# Patient Record
Sex: MALE | Race: ASIAN | NOT HISPANIC OR LATINO | Employment: UNEMPLOYED | URBAN - METROPOLITAN AREA
[De-identification: names, ages, dates, MRNs, and addresses within clinical notes are randomized per-mention and may not be internally consistent; named-entity substitution may affect disease eponyms.]

---

## 2022-12-27 ENCOUNTER — APPOINTMENT (EMERGENCY)
Dept: CT IMAGING | Facility: HOSPITAL | Age: 82
End: 2022-12-27

## 2022-12-27 ENCOUNTER — HOSPITAL ENCOUNTER (EMERGENCY)
Facility: HOSPITAL | Age: 82
Discharge: HOME | End: 2022-12-27
Attending: EMERGENCY MEDICINE

## 2022-12-27 ENCOUNTER — APPOINTMENT (EMERGENCY)
Dept: RADIOLOGY | Facility: HOSPITAL | Age: 82
End: 2022-12-27

## 2022-12-27 VITALS
HEART RATE: 81 BPM | TEMPERATURE: 99.3 F | DIASTOLIC BLOOD PRESSURE: 60 MMHG | WEIGHT: 180 LBS | OXYGEN SATURATION: 95 % | SYSTOLIC BLOOD PRESSURE: 124 MMHG | RESPIRATION RATE: 22 BRPM

## 2022-12-27 DIAGNOSIS — J44.1 COPD EXACERBATION (HCC): Primary | ICD-10-CM

## 2022-12-27 LAB
2HR DELTA HS TROPONIN: 0 NG/L
ANION GAP SERPL CALCULATED.3IONS-SCNC: 8 MMOL/L (ref 4–13)
BASOPHILS # BLD MANUAL: 0 THOUSAND/UL (ref 0–0.1)
BASOPHILS NFR MAR MANUAL: 0 % (ref 0–1)
BUN SERPL-MCNC: 25 MG/DL (ref 5–25)
CALCIUM SERPL-MCNC: 9.3 MG/DL (ref 8.4–10.2)
CARDIAC TROPONIN I PNL SERPL HS: 7 NG/L
CARDIAC TROPONIN I PNL SERPL HS: 7 NG/L
CHLORIDE SERPL-SCNC: 106 MMOL/L (ref 96–108)
CO2 SERPL-SCNC: 22 MMOL/L (ref 21–32)
CREAT SERPL-MCNC: 1.03 MG/DL (ref 0.6–1.3)
D DIMER PPP FEU-MCNC: 1.59 UG/ML FEU
EOSINOPHIL # BLD MANUAL: 0 THOUSAND/UL (ref 0–0.4)
EOSINOPHIL NFR BLD MANUAL: 0 % (ref 0–6)
ERYTHROCYTE [DISTWIDTH] IN BLOOD BY AUTOMATED COUNT: 13.1 % (ref 11.6–15.1)
FLUAV RNA RESP QL NAA+PROBE: NEGATIVE
FLUBV RNA RESP QL NAA+PROBE: NEGATIVE
GFR SERPL CREATININE-BSD FRML MDRD: 67 ML/MIN/1.73SQ M
GLUCOSE SERPL-MCNC: 121 MG/DL (ref 65–140)
HCT VFR BLD AUTO: 46 % (ref 36.5–49.3)
HGB BLD-MCNC: 15 G/DL (ref 12–17)
LYMPHOCYTES # BLD AUTO: 0.26 THOUSAND/UL (ref 0.6–4.47)
LYMPHOCYTES # BLD AUTO: 6 % (ref 14–44)
MAGNESIUM SERPL-MCNC: 2.2 MG/DL (ref 1.9–2.7)
MCH RBC QN AUTO: 31.6 PG (ref 26.8–34.3)
MCHC RBC AUTO-ENTMCNC: 32.6 G/DL (ref 31.4–37.4)
MCV RBC AUTO: 97 FL (ref 82–98)
MONOCYTES # BLD AUTO: 0.09 THOUSAND/UL (ref 0–1.22)
MONOCYTES NFR BLD: 2 % (ref 4–12)
NEUTROPHILS # BLD MANUAL: 4.05 THOUSAND/UL (ref 1.85–7.62)
NEUTS BAND NFR BLD MANUAL: 7 % (ref 0–8)
NEUTS SEG NFR BLD AUTO: 85 % (ref 43–75)
PLATELET # BLD AUTO: 156 THOUSANDS/UL (ref 149–390)
PLATELET BLD QL SMEAR: ADEQUATE
PMV BLD AUTO: 10.2 FL (ref 8.9–12.7)
POTASSIUM SERPL-SCNC: 3.9 MMOL/L (ref 3.5–5.3)
RBC # BLD AUTO: 4.74 MILLION/UL (ref 3.88–5.62)
RBC MORPH BLD: NORMAL
RSV RNA RESP QL NAA+PROBE: NEGATIVE
SARS-COV-2 RNA RESP QL NAA+PROBE: NEGATIVE
SODIUM SERPL-SCNC: 136 MMOL/L (ref 135–147)
WBC # BLD AUTO: 4.4 THOUSAND/UL (ref 4.31–10.16)

## 2022-12-27 RX ORDER — ALBUTEROL SULFATE 90 UG/1
1-2 AEROSOL, METERED RESPIRATORY (INHALATION) EVERY 6 HOURS PRN
Qty: 18 G | Refills: 0 | Status: SHIPPED | OUTPATIENT
Start: 2022-12-27

## 2022-12-27 RX ORDER — LISINOPRIL AND HYDROCHLOROTHIAZIDE 12.5; 1 MG/1; MG/1
1 TABLET ORAL DAILY
COMMUNITY

## 2022-12-27 RX ORDER — IPRATROPIUM BROMIDE AND ALBUTEROL SULFATE 2.5; .5 MG/3ML; MG/3ML
3 SOLUTION RESPIRATORY (INHALATION) ONCE
Status: COMPLETED | OUTPATIENT
Start: 2022-12-27 | End: 2022-12-27

## 2022-12-27 RX ORDER — ALBUTEROL SULFATE 90 UG/1
2 AEROSOL, METERED RESPIRATORY (INHALATION) ONCE
Status: COMPLETED | OUTPATIENT
Start: 2022-12-27 | End: 2022-12-27

## 2022-12-27 RX ORDER — AMLODIPINE BESYLATE 5 MG/1
5 TABLET ORAL DAILY
COMMUNITY

## 2022-12-27 RX ORDER — MAGNESIUM SULFATE 1 G/100ML
1 INJECTION INTRAVENOUS ONCE
Status: COMPLETED | OUTPATIENT
Start: 2022-12-27 | End: 2022-12-27

## 2022-12-27 RX ORDER — ESCITALOPRAM OXALATE 10 MG/1
10 TABLET ORAL DAILY
COMMUNITY

## 2022-12-27 RX ORDER — ROSUVASTATIN CALCIUM 10 MG/1
10 TABLET, COATED ORAL DAILY
COMMUNITY

## 2022-12-27 RX ORDER — METHYLPREDNISOLONE 4 MG/1
TABLET ORAL
Qty: 21 TABLET | Refills: 0 | Status: SHIPPED | OUTPATIENT
Start: 2022-12-27

## 2022-12-27 RX ORDER — SODIUM CHLORIDE FOR INHALATION 0.9 %
3 VIAL, NEBULIZER (ML) INHALATION ONCE
Status: COMPLETED | OUTPATIENT
Start: 2022-12-27 | End: 2022-12-27

## 2022-12-27 RX ORDER — SODIUM CHLORIDE 9 MG/ML
3 INJECTION INTRAVENOUS
Status: DISCONTINUED | OUTPATIENT
Start: 2022-12-27 | End: 2022-12-27 | Stop reason: HOSPADM

## 2022-12-27 RX ORDER — METHYLPREDNISOLONE SODIUM SUCCINATE 125 MG/2ML
125 INJECTION, POWDER, LYOPHILIZED, FOR SOLUTION INTRAMUSCULAR; INTRAVENOUS ONCE
Status: COMPLETED | OUTPATIENT
Start: 2022-12-27 | End: 2022-12-27

## 2022-12-27 RX ADMIN — ALBUTEROL SULFATE 10 MG: 2.5 SOLUTION RESPIRATORY (INHALATION) at 01:17

## 2022-12-27 RX ADMIN — METHYLPREDNISOLONE SODIUM SUCCINATE 125 MG: 125 INJECTION, POWDER, FOR SOLUTION INTRAMUSCULAR; INTRAVENOUS at 00:29

## 2022-12-27 RX ADMIN — ALBUTEROL SULFATE 2 PUFF: 90 AEROSOL, METERED RESPIRATORY (INHALATION) at 05:59

## 2022-12-27 RX ADMIN — IOHEXOL 90 ML: 350 INJECTION, SOLUTION INTRAVENOUS at 02:35

## 2022-12-27 RX ADMIN — ISODIUM CHLORIDE 3 ML: 0.03 SOLUTION RESPIRATORY (INHALATION) at 01:17

## 2022-12-27 RX ADMIN — IPRATROPIUM BROMIDE 1 MG: 0.5 SOLUTION RESPIRATORY (INHALATION) at 01:17

## 2022-12-27 RX ADMIN — MAGNESIUM SULFATE HEPTAHYDRATE 1 G: 1 INJECTION, SOLUTION INTRAVENOUS at 04:25

## 2022-12-27 RX ADMIN — IPRATROPIUM BROMIDE AND ALBUTEROL SULFATE 3 ML: .5; 3 SOLUTION RESPIRATORY (INHALATION) at 00:28

## 2022-12-27 RX ADMIN — IPRATROPIUM BROMIDE AND ALBUTEROL SULFATE 3 ML: 2.5; .5 SOLUTION RESPIRATORY (INHALATION) at 04:23

## 2022-12-27 NOTE — ED PROCEDURE NOTE
PROCEDURE  ECG 12 Lead Documentation Only    Date/Time: 12/27/2022 3:00 AM  Performed by: Chuck Sharma MD  Authorized by: Chuck Sharma MD     Indications / Diagnosis:  Sob   Patient location:  ED  Interpretation:     Interpretation: non-specific    Rate:     ECG rate:  86    ECG rate assessment: normal    Rhythm:     Rhythm: sinus rhythm    Ectopy:     Ectopy: none    QRS:     QRS axis:  Normal  Conduction:     Conduction: normal    ST segments:     ST segments:  Non-specific  T waves:     T waves: non-specific           Chuck Sharma MD  12/27/22 9446

## 2022-12-27 NOTE — DISCHARGE INSTRUCTIONS
RETURN IF WORSE IN ANY WAY:   WORSENING SHORTNESS OF BREATH,   CHEST PAIN,  FEVER OR FLU LIKE SYMPTOMS,   OR NEW AND CONCERNING SYMPTOMS SIGNS OR SYMPTOMS      PLEASE CALL YOUR PRIMARY DOCTOR IN THE MORNING TO SET UP FOLLOW UP   PLEASE REVIEW THE WORK UP RESULTS WITH YOUR DOCTOR

## 2022-12-27 NOTE — ED CARE HANDOFF
Emergency Department Sign Out Note        Sign out and transfer of care from Dr Sarita Matt  See Separate Emergency Department note  The patient, Yocasta Cabral, was evaluated by the previous provider for COPD  Workup Completed:  Basic labs, ekg, troponin  cxr  D dimer pending    ED Course / Workup Pending (followup): HEART Risk Score    Flowsheet Row Most Recent Value   Heart Score Risk Calculator    History 0 Filed at: 12/27/2022 0145   ECG 1 Filed at: 12/27/2022 0145   Age 2 Filed at: 12/27/2022 0145   Risk Factors 2 Filed at: 12/27/2022 0145   Troponin 0 Filed at: 12/27/2022 0145   HEART Score 5 Filed at: 12/27/2022 0145                        Wells' Criteria for PE    Flowsheet Row Most Recent Value   Wells' Criteria for PE    Clinical signs and symptoms of DVT --   PE is primary diagnosis or equally likely 3 Filed at: 12/27/2022 0145   HR >100 1 5 Filed at: 12/27/2022 0145   Immobilization at least 3 days or Surgery in the previous 4 weeks 1 5 Filed at: 12/27/2022 0145   Previous, objectively diagnosed PE or DVT 0 Filed at: 12/27/2022 0145   Hemoptysis 0 Filed at: 12/27/2022 0145   Malignancy with treatment within 6 months or palliative 0 Filed at: 12/27/2022 0145   Wells' Criteria Total 6 Filed at: 12/27/2022 0145                ED Course as of 12/27/22 0602   Tue Dec 27, 2022   0101 Sign out:     Pt here for SOB  Undiagnosed copd  Moderate to severe wheezing  Spo2 90%    Pending D dimer and Delta trop for obs     Labs unremarkable  Cxr non concerning for pna    0144 Pt stable on neb  Reviewed results w/ pt and wife at bedside    0145 Manual Differential(PHLEBS Do Not Order)(!)   0145 CBC and differential   0145 D-Dimer, Quant(!): 1 59   0145 hs TnI 0hr: 7   0145 FLU/RSV/COVID - if FLU/RSV clinically relevant   4204 Basic metabolic panel   3975 Delta 2hr hsTnI: 0   0401 CTA - CHEST WITH IV CONTRAST - PULMONARY ANGIOGRAM       FINDINGS:     PULMONARY ARTERIAL TREE:  No pulmonary embolus is seen     LUNGS:  Mild bilateral atelectatic and emphysematous lung changes are present  Is no focal infiltrate or pleural effusion  There is mild right apical scarring  There is no tracheal or endobronchial lesion      PLEURA:  Unremarkable      HEART/GREAT VESSELS:  Unremarkable for patient's age  No thoracic aortic aneurysm      MEDIASTINUM AND TARI:  Unremarkable      CHEST WALL AND LOWER NECK:   Unremarkable      VISUALIZED STRUCTURES IN THE UPPER ABDOMEN:  There is a 3 5 cm left renal mid to lower pole cyst      OSSEOUS STRUCTURES:  No acute fracture or destructive osseous lesion      IMPRESSION:     No intraluminal filling defect to suggest a pulmonary embolus      Poor inspiratory effort with bilateral atelectatic lung changes  No infiltrate or pleural effusion      Mild diffuse emphysematous lung changes  0418 Pt improved from arrival  Wheezing is only mild  Respiratory effort is normal   Sats remain wnl    0527 Magnesium: 2 2   0532 Patient's wheezing is completely resolved now    He wants to manage from home    He is ambulated already to the bathroom without any assistance or difficulty  I will recheck him 1 more time and discharge him at his request, along with his oxygen saturations stable    He understands return precautions    No indications for antibiotics  He has a nonproductive cough, no fever, normal white count       0540 Pt to and from bathroom  Sats stable at 98%  Wheezing remains resolved  Pt requesting dc      Procedures  MDM        Disposition  Final diagnoses:   COPD exacerbation ([address] )     Time reflects when diagnosis was documented in both MDM as applicable and the Disposition within this note     Time User Action Codes Description Comment    12/27/2022  5:33 AM Rosario Grace Add [J44 1] COPD exacerbation [facility])       ED Disposition     ED Disposition   Discharge    Condition   Stable    Date/Time   Tue Dec 27, 2022  5:33 AM    Tyra Mcghee discharge to home/self care  Follow-up Information    None       Patient's Medications   Discharge Prescriptions    ALBUTEROL (PROVENTIL HFA) 90 MCG/ACT INHALER    Inhale 1-2 puffs every 6 (six) hours as needed for wheezing       Start Date: 12/27/2022End Date: --       Order Dose: 1-2 puffs       Quantity: 18 g    Refills: 0    METHYLPREDNISOLONE 4 MG TABLET THERAPY PACK    Use as directed on package       Start Date: 12/27/2022End Date: --       Order Dose: --       Quantity: 21 tablet    Refills: 0     No discharge procedures on file         ED Provider  Electronically Signed by     Ramos Archuleta MD  12/27/22 3907

## 2022-12-27 NOTE — ED PROCEDURE NOTE
PROCEDURE  CriticalCare Time  Performed by: Katy Rodriguez MD  Authorized by: Katy Rodriguez MD     Critical care provider statement:     Critical care time (minutes):  65    Critical care start time:  12/27/2022 4:00 AM    Critical care end time:  12/27/2022 5:30 AM    Critical care time was exclusive of:  Separately billable procedures and treating other patients    Critical care was necessary to treat or prevent imminent or life-threatening deterioration of the following conditions:  Dehydration (acute copd )    Critical care was time spent personally by me on the following activities:  Examination of patient, evaluation of patient's response to treatment, discussions with primary provider, discussions with consultants, development of treatment plan with patient or surrogate, review of old charts, re-evaluation of patient's condition, ordering and review of radiographic studies, ordering and performing treatments and interventions and ordering and review of laboratory studies         Katy Rodriguez MD  12/27/22 5691

## 2022-12-27 NOTE — ED PROVIDER NOTES
History  Chief Complaint   Patient presents with   • Shortness of Breath     Pt from [place] visiting family , sudden onset of chills and wheezing started at dinner tonight      Patient is an 28-year-old male with a history of smoking the presents for evaluation of wheezing  Wife tells me the patient is dealing with wheezing for months  It typically comes on when he is exerting himself like going upstairs and then spontaneously resolves  No known history of COPD and he not use any inhalers  Patient says that about an hour prior to ED arrival he had the abrupt onset of severe wheezing, chills and dyspnea  This is persisted  No associated chest pain nausea vomiting or diaphoresis  No abdominal pain urinary or bowel symptoms  Wheezing audible across the room  Prior to Admission Medications   Prescriptions Last Dose Informant Patient Reported? Taking? amLODIPine (NORVASC) 5 mg tablet   Yes Yes   Sig: Take 5 mg by mouth daily   escitalopram (LEXAPRO) 10 mg tablet   Yes Yes   Sig: Take 10 mg by mouth daily   lisinopril-hydrochlorothiazide (PRINZIDE,ZESTORETIC) 10-12 5 MG per tablet   Yes Yes   Sig: Take 1 tablet by mouth daily   rosuvastatin (CRESTOR) 10 MG tablet   Yes Yes   Sig: Take 10 mg by mouth daily      Facility-Administered Medications: None       Past Medical History:   Diagnosis Date   • Lymphoma ([facility] )    • [facility]    • Stroke [facility])        Past Surgical History:   Procedure Laterality Date   • APPENDECTOMY     • PROSTATE BIOPSY         History reviewed  No pertinent family history  I have reviewed and agree with the history as documented  E-Cigarette/Vaping     E-Cigarette/Vaping Substances     Social History     Tobacco Use   • Smoking status: Former     Types: Cigarettes     Passive exposure: Past   • Smokeless tobacco: Never   Substance Use Topics   • Alcohol use: Not Currently   • Drug use: Never       Review of Systems   Constitutional: Positive for chills   Negative for fever    HENT: Negative for sore throat  Eyes: Negative for photophobia  Respiratory: Positive for cough, shortness of breath and wheezing  Cardiovascular: Negative for chest pain  Gastrointestinal: Negative for abdominal pain  Genitourinary: Negative for dysuria  Musculoskeletal: Negative for back pain  Skin: Negative for rash  Neurological: Negative for light-headedness  Hematological: Negative for adenopathy  Psychiatric/Behavioral: Negative for agitation  All other systems reviewed and are negative  Physical Exam  Physical Exam  Vitals reviewed  Constitutional:       General: He is not in acute distress  Appearance: He is well-developed  HENT:      Head: Normocephalic  Eyes:      Pupils: Pupils are equal, round, and reactive to light  Cardiovascular:      Rate and Rhythm: Normal rate and regular rhythm  Heart sounds: Normal heart sounds  No murmur heard  No friction rub  No gallop  Pulmonary:      Comments: Expiratory wheezing heard across the room  Wheezing in all lung fields  Mild tachypnea noted  Abdominal:      General: Bowel sounds are normal  There is no distension  Palpations: Abdomen is soft  Tenderness: There is no abdominal tenderness  There is no guarding  Musculoskeletal:         General: Normal range of motion  Cervical back: Normal range of motion and neck supple  Skin:     Capillary Refill: Capillary refill takes less than 2 seconds  Neurological:      Mental Status: He is alert and oriented to person, place, and time  Cranial Nerves: No cranial nerve deficit  Sensory: No sensory deficit  Motor: No abnormal muscle tone  Psychiatric:         Behavior: Behavior normal          Thought Content:  Thought content normal          Judgment: Judgment normal          Vital Signs  ED Triage Vitals [12/26/22 2353]   Temperature Pulse Respirations Blood Pressure SpO2   99 3 °F (37 4 °C) 69 (!) 25 144/72 96 %      Temp src Heart Rate Source Patient Position - Orthostatic VS BP Location FiO2 (%)   -- Monitor -- -- --      Pain Score       No Pain           Vitals:    12/26/22 2353 12/27/22 0019 12/27/22 0203 12/27/22 0303   BP: 144/72 142/68 131/62 124/60   Pulse: 69 78 79 81         Visual Acuity      ED Medications  Medications   ipratropium-albuterol (DUO-NEB) 0 5-2 5 mg/3 mL inhalation solution 3 mL (3 mL Nebulization Given 12/27/22 0028)   albuterol inhalation solution 10 mg (10 mg Nebulization Given 12/27/22 0117)     And   ipratropium (ATROVENT) 0 02 % inhalation solution 1 mg (1 mg Nebulization Given 12/27/22 0117)     And   sodium chloride 0 9 % inhalation solution 3 mL (3 mL Nebulization Given 12/27/22 0117)   methylPREDNISolone sodium succinate (Solu-MEDROL) injection 125 mg (125 mg Intravenous Given 12/27/22 0029)   iohexol (OMNIPAQUE) 350 MG/ML injection (SINGLE-DOSE) 90 mL (90 mL Intravenous Given 12/27/22 0235)   ipratropium-albuterol (DUO-NEB) 0 5-2 5 mg/3 mL inhalation solution 3 mL (3 mL Nebulization Given 12/27/22 0423)   magnesium sulfate IVPB (premix) SOLN 1 g (0 g Intravenous Stopped 12/27/22 0540)   albuterol (PROVENTIL HFA,VENTOLIN HFA) inhaler 2 puff (2 puffs Inhalation Given 12/27/22 0559)       Diagnostic Studies  Results Reviewed     Procedure Component Value Units Date/Time    Magnesium [[id number]]  (Normal) Collected: 12/27/22 0025    Lab Status: Final result Specimen: Blood from Arm, Left Updated: 12/27/22 0440     Magnesium 2 2 mg/dL     HS Troponin I 2hr [[id number]]  (Normal) Collected: 12/27/22 0300    Lab Status: Final result Specimen: Blood from Arm, Left Updated: 12/27/22 0330     hs TnI 2hr 7 ng/L      Delta 2hr hsTnI 0 ng/L     D-dimer, quantitative [[id number]]  (Abnormal) Collected: 12/27/22 0025    Lab Status: Final result Specimen: Blood from Arm, Left Updated: 12/27/22 0115     D-Dimer, Quant 1 59 ug/ml FEU     Narrative:       In the evaluation for possible pulmonary embolism, in the appropriate (Well's Score of 4 or less) patient, the age adjusted d-dimer cutoff for this patient can be calculated as:    Age x 0 01 (in ug/mL) for Age-adjusted D-dimer exclusion threshold for a patient over 50 years  FLU/RSV/COVID - if FLU/RSV clinically relevant [[id number]]  (Normal) Collected: 12/27/22 0025    Lab Status: Final result Specimen: Nares from Nasopharyngeal Swab Updated: 12/27/22 0112     SARS-CoV-2 Negative     INFLUENZA A PCR Negative     INFLUENZA B PCR Negative     RSV PCR Negative    Narrative:      FOR PEDIATRIC PATIENTS - copy/paste COVID Guidelines URL to browser: [email or web address]  [email or web address]    SARS-CoV-2 assay is a Nucleic Acid Amplification assay intended for the  qualitative detection of nucleic acid from SARS-CoV-2 in nasopharyngeal  swabs  Results are for the presumptive identification of SARS-CoV-2 RNA  Positive results are indicative of infection with SARS-CoV-2, the virus  causing COVID-19, but do not rule out bacterial infection or co-infection  with other viruses  Laboratories within the [place] and its  territories are required to report all positive results to the appropriate  public health authorities  Negative results do not preclude SARS-CoV-2  infection and should not be used as the sole basis for treatment or other  patient management decisions  Negative results must be combined with  clinical observations, patient history, and epidemiological information  This test has not been FDA cleared or approved  This test has been authorized by FDA under an Emergency Use Authorization  (EUA)  This test is only authorized for the duration of time the  declaration that circumstances exist justifying the authorization of the  emergency use of an in vitro diagnostic tests for detection of SARS-CoV-2  virus and/or diagnosis of COVID-19 infection under section 564(b)(1) of  the Act, 21 U  S C  724DIQ-3(R)(1), unless the authorization is terminated  or revoked sooner  The test has been validated but independent review by FDA  and CLIA is pending  Test performed using [employer] Gene[employer]pert: This RT-PCR assay targets N2,  a region unique to SARS-CoV-2  A conserved region in the E-gene was chosen  for pan-Sarbecovirus detection which includes SARS-CoV-2  According to CMS-2020-01-R, this platform meets the definition of high-throughput technology      CBC and differential [[id number]]  (Normal) Collected: 12/27/22 0025    Lab Status: Final result Specimen: Blood from Arm, Left Updated: 12/27/22 0102     WBC 4 40 Thousand/uL      RBC 4 74 Million/uL      Hemoglobin 15 0 g/dL      Hematocrit 46 0 %      MCV 97 fL      MCH 31 6 pg      MCHC 32 6 g/dL      RDW 13 1 %      MPV 10 2 fL      Platelets 140 Thousands/uL     Manual Differential(PHLEBS Do Not Order) [[id number]]  (Abnormal) Collected: 12/27/22 0025    Lab Status: Final result Specimen: Blood from Arm, Left Updated: 12/27/22 0102     Segmented % 85 %      Bands % 7 %      Lymphocytes % 6 %      Monocytes % 2 %      Eosinophils, % 0 %      Basophils % 0 %      Absolute Neutrophils 4 05 Thousand/uL      Lymphocytes Absolute 0 26 Thousand/uL      Monocytes Absolute 0 09 Thousand/uL      Eosinophils Absolute 0 00 Thousand/uL      Basophils Absolute 0 00 Thousand/uL      Total Counted --     RBC Morphology Normal     Platelet Estimate Adequate    HS Troponin 0hr (reflex protocol) [[id number]]  (Normal) Collected: 12/27/22 0025    Lab Status: Final result Specimen: Blood from Arm, Left Updated: 12/27/22 0100     hs TnI 0hr 7 ng/L     Basic metabolic panel [[id number]] Collected: 12/27/22 0025    Lab Status: Final result Specimen: Blood from Arm, Left Updated: 12/27/22 0052     Sodium 136 mmol/L      Potassium 3 9 mmol/L      Chloride 106 mmol/L      CO2 22 mmol/L      ANION GAP 8 mmol/L      BUN 25 mg/dL      Creatinine 1 03 mg/dL      Glucose 121 mg/dL      Calcium 9 3 mg/dL      eGFR 67 ml/min/1 73sq m     Narrative:      Roger guidelines for Chronic Kidney Disease (CKD):   •  Stage 1 with normal or high GFR (GFR > 90 mL/min/1 73 square meters)  •  Stage 2 Mild CKD (GFR = 60-89 mL/min/1 73 square meters)  •  Stage 3A Moderate CKD (GFR = 45-59 mL/min/1 73 square meters)  •  Stage 3B Moderate CKD (GFR = 30-44 mL/min/1 73 square meters)  •  Stage 4 Severe CKD (GFR = 15-29 mL/min/1 73 square meters)  •  Stage 5 End Stage CKD (GFR <15 mL/min/1 73 square meters)  Note: GFR calculation is accurate only with a steady state creatinine                 CTA ED chest PE Study   Final Result by Honey Rodriguez MD (12/27 4818)      No intraluminal filling defect to suggest a pulmonary embolus  Poor inspiratory effort with bilateral atelectatic lung changes  No infiltrate or pleural effusion  Mild diffuse emphysematous lung changes  Workstation performed: [id number]         X-ray chest 1 view portable   Final Result by Merlinda Lambert, MD (12/27 5569)      Low lung volumes with vascular crowding  No acute disease                    Workstation performed: [id number]                    Procedures  ECG 12 Lead Documentation Only    Date/Time: 12/27/2022 10:30 PM  Performed by: Kasey Chacon MD  Authorized by: Kasey Chacon MD     ECG reviewed by me, the ED Provider: yes    Patient location:  ED  Previous ECG:     Previous ECG:  Unavailable    Comparison to cardiac monitor: Yes    Interpretation:     Interpretation: normal    Rate:     ECG rate assessment: normal    Rhythm:     Rhythm: sinus rhythm    Ectopy:     Ectopy: none    QRS:     QRS axis:  Normal    QRS intervals:  Normal  Conduction:     Conduction: normal    ST segments:     ST segments:  Normal  T waves:     T waves: normal    Comments:      Sinus arrhythmia             ED Course             HEART Risk Score    Flowsheet Row Most Recent Value   Heart Score Risk Calculator    History 0 Filed at: 12/27/2022 0145   ECG 1 Filed at: 12/27/2022 0145   Age 2 Filed at: 12/27/2022 0145   Risk Factors 2 Filed at: 12/27/2022 0145   Troponin 0 Filed at: 12/27/2022 0145   HEART Score 5 Filed at: 12/27/2022 0145                            Pedro' Criteria for PE    Flowsheet Row Most Recent Value   Wells' Criteria for PE    Clinical signs and symptoms of DVT --   PE is primary diagnosis or equally likely 3 Filed at: 12/27/2022 0145   HR >100 1 5 Filed at: 12/27/2022 0145   Immobilization at least 3 days or Surgery in the previous 4 weeks 1 5 Filed at: 12/27/2022 0145   Previous, objectively diagnosed PE or DVT 0 Filed at: 12/27/2022 0145   Hemoptysis 0 Filed at: 12/27/2022 0145   Malignancy with treatment within 6 months or palliative 0 Filed at: 12/27/2022 0145   Pedro' Criteria Total 6 Filed at: 12/27/2022 0145                MDM  Number of Diagnoses or Management Options  COPD exacerbation ([facility] )  Diagnosis management comments: Patient is an 52-year-old male presents for evaluation of wheezing and dyspnea  Clinically appears likely undiagnosed COPD exacerbation  Given a breathing treatment with some improvement of symptoms  Pending D-dimer and delta troponin when signed out to Dr Rodrick Floyd  Disposition  Final diagnoses:   [facility]     Time reflects when diagnosis was documented in both MDM as applicable and the Disposition within this note     Time User Action Codes Description Comment    12/27/2022  5:33 AM Shandra Boyer [J44 1] COPD exacerbation [facility])       ED Disposition     ED Disposition   Discharge    Condition   Stable    Date/Time   Tue Dec 27, 2022  5:33 AM    Comment   Orlando Padilla discharge to home/self care                 Follow-up Information    None         Discharge Medication List as of 12/27/2022  5:45 AM      START taking these medications    Details   albuterol (Proventil HFA) 90 mcg/act inhaler Inhale 1-2 puffs every 6 (six) hours as needed for wheezing, Starting Tue 12/27/2022, Normal      methylPREDNISolone 4 MG tablet therapy pack Use as directed on package, Normal         CONTINUE these medications which have NOT CHANGED    Details   amLODIPine (NORVASC) 5 mg tablet Take 5 mg by mouth daily, Historical Med      escitalopram (LEXAPRO) 10 mg tablet Take 10 mg by mouth daily, Historical Med      lisinopril-hydrochlorothiazide (PRINZIDE,ZESTORETIC) 10-12 5 MG per tablet Take 1 tablet by mouth daily, Historical Med      rosuvastatin (CRESTOR) 10 MG tablet Take 10 mg by mouth daily, Historical Med             No discharge procedures on file      PDMP Review     None          ED Provider  Electronically Signed by           Rogers Dumas MD  12/27/22 6884

## 2022-12-28 LAB
ATRIAL RATE: 74 BPM
ATRIAL RATE: 86 BPM
P AXIS: 42 DEGREES
P AXIS: 43 DEGREES
PR INTERVAL: 182 MS
PR INTERVAL: 190 MS
QRS AXIS: -17 DEGREES
QRS AXIS: -19 DEGREES
QRSD INTERVAL: 78 MS
QRSD INTERVAL: 80 MS
QT INTERVAL: 392 MS
QT INTERVAL: 394 MS
QTC INTERVAL: 437 MS
QTC INTERVAL: 469 MS
T WAVE AXIS: 15 DEGREES
T WAVE AXIS: 18 DEGREES
VENTRICULAR RATE: 74 BPM
VENTRICULAR RATE: 86 BPM